# Patient Record
Sex: MALE | Race: WHITE | URBAN - METROPOLITAN AREA
[De-identification: names, ages, dates, MRNs, and addresses within clinical notes are randomized per-mention and may not be internally consistent; named-entity substitution may affect disease eponyms.]

---

## 2017-10-24 ENCOUNTER — APPOINTMENT (OUTPATIENT)
Dept: CT IMAGING | Facility: CLINIC | Age: 42
End: 2017-10-24
Attending: EMERGENCY MEDICINE
Payer: COMMERCIAL

## 2017-10-24 ENCOUNTER — HOSPITAL ENCOUNTER (EMERGENCY)
Facility: CLINIC | Age: 42
Discharge: HOME | End: 2017-10-24
Attending: EMERGENCY MEDICINE | Admitting: EMERGENCY MEDICINE
Payer: COMMERCIAL

## 2017-10-24 VITALS
DIASTOLIC BLOOD PRESSURE: 88 MMHG | SYSTOLIC BLOOD PRESSURE: 143 MMHG | OXYGEN SATURATION: 96 % | HEART RATE: 105 BPM | TEMPERATURE: 98.2 F

## 2017-10-24 DIAGNOSIS — R51.9 NONINTRACTABLE EPISODIC HEADACHE, UNSPECIFIED HEADACHE TYPE: ICD-10-CM

## 2017-10-24 DIAGNOSIS — R06.00 DYSPNEA, UNSPECIFIED TYPE: ICD-10-CM

## 2017-10-24 DIAGNOSIS — R91.8 PULMONARY NODULES: ICD-10-CM

## 2017-10-24 LAB — TROPONIN I SERPL-MCNC: <0.015 UG/L (ref 0–0.04)

## 2017-10-24 PROCEDURE — 96375 TX/PRO/DX INJ NEW DRUG ADDON: CPT

## 2017-10-24 PROCEDURE — 96374 THER/PROPH/DIAG INJ IV PUSH: CPT | Mod: 59

## 2017-10-24 PROCEDURE — 71260 CT THORAX DX C+: CPT

## 2017-10-24 PROCEDURE — 99284 EMERGENCY DEPT VISIT MOD MDM: CPT | Mod: 25

## 2017-10-24 PROCEDURE — 25000128 H RX IP 250 OP 636: Performed by: EMERGENCY MEDICINE

## 2017-10-24 PROCEDURE — 93005 ELECTROCARDIOGRAM TRACING: CPT

## 2017-10-24 PROCEDURE — 84484 ASSAY OF TROPONIN QUANT: CPT | Performed by: EMERGENCY MEDICINE

## 2017-10-24 PROCEDURE — 99285 EMERGENCY DEPT VISIT HI MDM: CPT | Mod: 25

## 2017-10-24 RX ORDER — METOCLOPRAMIDE 10 MG/1
10 TABLET ORAL 4 TIMES DAILY PRN
Qty: 15 TABLET | Refills: 0 | Status: SHIPPED | OUTPATIENT
Start: 2017-10-24

## 2017-10-24 RX ORDER — IOPAMIDOL 755 MG/ML
500 INJECTION, SOLUTION INTRAVASCULAR ONCE
Status: COMPLETED | OUTPATIENT
Start: 2017-10-24 | End: 2017-10-24

## 2017-10-24 RX ORDER — DIPHENHYDRAMINE HYDROCHLORIDE 50 MG/ML
25 INJECTION INTRAMUSCULAR; INTRAVENOUS ONCE
Status: COMPLETED | OUTPATIENT
Start: 2017-10-24 | End: 2017-10-24

## 2017-10-24 RX ADMIN — SODIUM CHLORIDE 91 ML: 9 INJECTION, SOLUTION INTRAVENOUS at 17:21

## 2017-10-24 RX ADMIN — PROCHLORPERAZINE EDISYLATE 10 MG: 5 INJECTION INTRAMUSCULAR; INTRAVENOUS at 15:51

## 2017-10-24 RX ADMIN — DIPHENHYDRAMINE HYDROCHLORIDE 25 MG: 50 INJECTION, SOLUTION INTRAMUSCULAR; INTRAVENOUS at 15:48

## 2017-10-24 RX ADMIN — IOPAMIDOL 83 ML: 755 INJECTION, SOLUTION INTRAVENOUS at 17:21

## 2017-10-24 ASSESSMENT — ENCOUNTER SYMPTOMS
SHORTNESS OF BREATH: 1
NUMBNESS: 0
APPETITE CHANGE: 1
CHEST TIGHTNESS: 1
HEADACHES: 1
BLOOD IN STOOL: 0
WEAKNESS: 1
CHILLS: 1

## 2017-10-24 NOTE — ED PROVIDER NOTES
History     Chief Complaint:  Headache and Shortness of Breath      HPI   Sanjay Soliz is a 42 year old diabetic male who presents with a one-week history of a headache and shortness of breath. He came from clinic today with labs below. Last week he had episodes of subjective fevers and chills that prompted him to call his clinic. He had a headache that came on with its typical presentation of prior headaches. Advil helps with his headaches for a period of time. He states he normally gets headaches which he attributes to his job, but they don't last for an entire week. This is associated with shortness of breath over the course of this week. He states he has been more active which tends to exacerbate this, in addition to some centralized chest pressure. The chest discomfort has been present for one week and constant. There are alleviating or aggravating factors to his pain. He also endorses decreased appetite which has left him with some generalized weakness. Patient denies numbness and hemoptysis.    Labs from clinic, 10/24/17  CBC:  WBC 4.2 (L), HGB 14.7,  (L)  BMP: Glucose 210 (H) o/w WNL  D-Dimer: 1.91 (H)    Cardiac Risk Factors   Sex: Male   Tobacco: Negative   Hypertension: Negative  Diabetes: Positive  Hyperlipidemia: Positive  Family History: Positive; Dad had a triple bypass 3 years ago at age 64.     PE/DVT Risk Factors   Personal History: Negative  Recent Travel: Positive; Travel to [place] 2 weeks ago  Recent Surgery/Hospitalization: Negative  Tobacco: Negative  Family History: Negative  Hormone Use: Negative   Cancer: Negative  Trauma: Negative      Allergies:  No known drug allergies.    Medications:    The patient is not currently taking any prescribed medications.     Past Medical History:    Diabetes  Hyperlipidemia   Testicular hypofunction     Past Surgical History:    History reviewed. No pertinent past surgical history.    Family History:    History reviewed. No pertinent family  history.    Social History:  Marital Status:   Presents to the ED from clinic with his wife.   Tobacco Use: Former smoker (Quit: 2011)  PCP: Rachelle Myers     Review of Systems   Constitutional: Positive for appetite change and chills.   Respiratory: Positive for chest tightness and shortness of breath.    Gastrointestinal: Negative for blood in stool.   Neurological: Positive for weakness and headaches. Negative for numbness.   All other systems reviewed and are negative.      Physical Exam   First Vitals:  BP: (!) 129/96  Pulse: 105  Temp: 98.2  F (36.8  C)  Resp: 20  SpO2: 96 %      Physical Exam    General:   Pleasant, age appropriate male resting with no signs of discomfort.  HEENT:    Oropharynx is moist, without lesions or trismus.     PERRL; EOMs intact  Eyes:    Conjunctiva normal  Neck:    Supple, no meningismus.     CV:     Tachycardic, regular rhythm.      No murmurs, rubs or gallops.       No JVD or unilateral leg swelling.       2+ radial pulses bilateral.       No lower extremity edema.  PULM:    Clear to auscultation bilateral.       No respiratory distress.      Good air exchange.     No rales or wheezing.     No stridor.  ABD:    Soft, non-tender, non-distended.       No pulsatile masses.       No rebound, guarding or rigidity.  MSK:     No gross deformity to all four extremities.   LYMPH:   No cervical lymphadenopathy.  NEURO:   Alert & oriented x3.      CN II-XII intact, speech is clear with no aphasia.       Finger to nose within normal limits.  No pronator drift.       Strength is 5/5 in all 4 extremities.  Sensation is intact.       Normal muscular tone, no tremor.     No atrophy.  Skin:    Warm, dry and intact.    Psych:    Mood is good and affect is appropriate.        Emergency Department Course   ECG:  @ 1457  Indication: shortness of breath   Vent. Rate 101 bpm. IN interval 152 ms. QRS duration 90 ms. QT/QTc 334/433 ms. P-R-T axis 24 28 38.   Sinus tachycardia. Otherwise  normal ECG.     Read @ 1458 by Dr. De La Torre.    Imaging:  Chest CT,  IV contrast only, PE protocol, per radiology:  IMPRESSION:   1. No pulmonary embolism demonstrated.  2. No thoracic aortic dissection or aneurysm.   3. 10 x 9 mm pulmonary nodule along the left major fissure. 3 month  follow-up study versus PET CT recommended based on clinical suspicion  and/or risk factors for further evaluation.    Radiographic findings were communicated with the patient who voiced understanding of the findings.    Laboratory:  Troponin: <0.015    Interventions:  1548: Benadryl, 25 mg, IV injection  1551: Compazine, 10  Mg, IV injection    Emergency Department Course:  Nursing notes and vitals reviewed.  I performed an exam of the patient as documented above.  The above workup was undertaken.  1806: I rechecked the patient and discussed results.  Findings and plan explained to the Patient. Patient discharged home, status improved, with instructions regarding supportive care, medications, and reasons to return as well as the importance of close follow-up was reviewed. Patient was prescribed Reglan.     Impression & Plan      Medical Decision Making:  Sanjay Soliz is a 42 year old male who presents to the ED with two complaints of 1) headache, 2) dyspnea. His primary complaint today is headache. He has no features consistent concerning for intracranial hemorrhage, mass, subarachnoid hemorrhage, glaucoma, temporal arteritis. Findings are consistent with mixed-type headache with migraine-tension features. Symptoms improved with interventions as described above. Patient will be discharged home with Reglan.     He had minor complaints of dyspnea in the context of a constellation of symptoms. He had an out patient D-Dimer which was elevated. With this elevated D-Dimer, dyspnea and recent travel history, CT chest was undertaken which reveals no evidence of PE. He has no signs of ACS. He was incidentally noted to have a right pulmonary  nodule which he was made aware of the need to follow up. Patient is safe for discharge home with close follow up with PCP.     Diagnosis:    ICD-10-CM    1. Nonintractable episodic headache, unspecified headache type R51    2. Dyspnea, unspecified type R06.00    3. Pulmonary nodules R91.8        Disposition:  Discharged to home.     Discharge Medications:  Discharge Medication List as of 10/24/2017  6:24 PM      START taking these medications    Details   metoclopramide (REGLAN) 10 MG tablet Take 1 tablet (10 mg) by mouth 4 times daily as needed, Disp-15 tablet, R-0, Local PrintPRN headache               I, Tonya Penny, am serving as a scribe on 10/24/2017 at 2:48 PM to personally document services performed by Dr. De La Torre based on my observations and the provider's statements to me.   [facility] EMERGENCY DEPARTMENT       Wilian De La Torre MD  10/24/17 1951

## 2017-10-24 NOTE — DISCHARGE INSTRUCTIONS
YOU WERE FOUND TO HAVE A LUNG NODULE THAT WILL NEED FURTHER FOLLOW UP WITH YOUR REGULAR DOCTOR.    Discharge Instructions  Headache    You were seen today for a headache. Headaches may be caused by many different things such as muscle tension, sinus inflammation, anxiety and stress, having too little sleep, too much alcohol, some medical conditions or injury. You may have a migraine, which is caused by changes in the blood vessels in your head.  At this time your provider does not find that your headache is a sign of anything dangerous or life-threatening.  However, sometimes the signs of serious illness do not show up right away.      Generally, every Emergency Department visit should have a follow-up clinic visit with either a primary or a specialty clinic/provider. Please follow-up as instructed by your emergency provider today.    Return to the Emergency Department if:    You get a new fever of 100.4 F or higher.    Your headache gets much worse.    You get a stiff neck with your headache.    You get a new headache that is significantly different or worse than headaches you have had before.    You are vomiting (throwing up) and cannot keep food or water down.    You have blurry or double vision or other problems with your eyes.    You have a new weakness on one side of your body.    You have difficulty with balance which is new.    You or your family thinks you are confused.    You have a seizure.    What can I do to help myself?    Pain medications - You may take a pain medication such as Tylenol  (acetaminophen), Advil , Motrin  (ibuprofen) or Aleve  (naproxen).    Take a pain reliever as soon as you notice symptoms.  Starting medications as soon as you start to have symptoms may lessen the amount of pain you have.    Relaxing in a quiet, dark room may help.    Get enough sleep and eat meals regularly.    You may need to watch for certain foods or other things which may trigger your headaches.  Keeping a  journal of your headaches and possible triggers may help you and your primary provider to identify things which you should avoid which may be causing your headaches.  If you were given a prescription for medicine here today, be sure to read all of the information (including the package insert) that comes with your prescription.  This will include important information about the medicine, its side effects, and any warnings that you need to know about.  The pharmacist who fills the prescription can provide more information and answer questions you may have about the medicine.  If you have questions or concerns that the pharmacist cannot address, please call or return to the Emergency Department.   Remember that you can always come back to the Emergency Department if you are not able to see your regular provider in the amount of time listed above, if you get any new symptoms, or if there is anything that worries you.  Discharge Instructions  Chest Pain    You have been seen today for chest pain or discomfort.  At this time, your provider has found no signs that your chest pain is due to a serious or life-threatening condition, (or you have declined more testing and/or admission to the hospital). However, sometimes there is a serious problem that does not show up right away. Your evaluation today may not be complete and you may need further testing and evaluation.     Generally, every Emergency Department visit should have a follow-up clinic visit with either a primary or a specialty clinic/provider. Please follow-up as instructed by your emergency provider today.  Return to the Emergency Department if:    Your chest pain changes, gets worse, starts to happen more often, or comes with less activity.    You are newly short of breath.    You get very weak or tired.    You pass out or faint.    You have any new symptoms, like fever, cough, numb legs, or you cough up blood.    You have anything else that worries  you.    Until you follow-up with your regular provider, please do the following:    Take one aspirin daily unless you have an allergy or are told not to by your provider.    If a stress test appointment has been made, go to the appointment.    If you have questions, contact your regular provider.    Follow-up with your regular provider/clinic as directed; this is very important.    If you were given a prescription for medicine here today, be sure to read all of the information (including the package insert) that comes with your prescription.  This will include important information about the medicine, its side effects, and any warnings that you need to know about.  The pharmacist who fills the prescription can provide more information and answer questions you may have about the medicine.  If you have questions or concerns that the pharmacist cannot address, please call or return to the Emergency Department.       Remember that you can always come back to the Emergency Department if you are not able to see your regular provider in the amount of time listed above, if you get any new symptoms, or if there is anything that worries you.  Discharge Instructions  Incidental Findings    An incidental finding is something unexpected that was found while you were being treated and is felt to not be related to the reason that you came to the Emergency Department.  While this finding is not an emergency, you need to follow up with your primary provider (or occasionally a specialist) to determine if anything should be done about it.    These findings can come from:    Checking your vital signs (example: high blood pressure).    Taking your history (example: unexplained weight loss).    The physical exam (example: a heart murmur).    Laboratory study (example: anemia or low blood count).    X-rays/ultrasound/CT or other imaging (example: an unexplained mass).    Generally, every Emergency Department visit should have a follow-up  clinic visit with either a primary or a specialty clinic/provider. Please follow-up as instructed by your emergency provider today.    Return to the Emergency Department if:    Your condition worsens.    You develop unexpected pain.    You now develop new symptoms or have new concerns.  If you were given a prescription for medicine here today, be sure to read all of the information (including the package insert) that comes with your prescription.  This will include important information about the medicine, its side effects, and any warnings that you need to know about.  The pharmacist who fills the prescription can provide more information and answer questions you may have about the medicine.  If you have questions or concerns that the pharmacist cannot address, please call or return to the Emergency Department.   Remember that you can always come back to the Emergency Department if you are not able to see your regular provider in the amount of time listed above, if you get any new symptoms, or if there is anything that worries you.

## 2017-10-24 NOTE — ED NOTES
Patient presents from clinic with headache and shortness of breath for one week, D-dimer was elevated at clinic, alert and oriented, ABCs intact.

## 2017-10-24 NOTE — ED AVS SNAPSHOT
[facility] Emergency Department    [address]    [facility] [address]    Phone:  [phone]    Fax:  [phone]                                       Sanjay Soliz   MRN: [id number]    Department:  [facility] Emergency Department   Date of Visit:  10/24/2017           Patient Information     Date Of Birth          1975        Your diagnoses for this visit were:     Nonintractable episodic headache, unspecified headache type     Dyspnea, unspecified type     Pulmonary nodules        You were seen by Wilian De La Torre MD.      Follow-up Information     Follow up with Rachelle Myers MD. Schedule an appointment as soon as possible for a visit in 3 days.    Specialty:  Family Practice    Contact information:    [facility]  [address] [facility] [id number] [phone]          Discharge Instructions       YOU WERE FOUND TO HAVE A LUNG NODULE THAT WILL NEED FURTHER FOLLOW UP WITH YOUR REGULAR DOCTOR.    Discharge Instructions  Headache    You were seen today for a headache. Headaches may be caused by many different things such as muscle tension, sinus inflammation, anxiety and stress, having too little sleep, too much alcohol, some medical conditions or injury. You may have a migraine, which is caused by changes in the blood vessels in your head.  At this time your provider does not find that your headache is a sign of anything dangerous or life-threatening.  However, sometimes the signs of serious illness do not show up right away.      Generally, every Emergency Department visit should have a follow-up clinic visit with either a primary or a specialty clinic/provider. Please follow-up as instructed by your emergency provider today.    Return to the Emergency Department if:    You get a new fever of 100.4 F or higher.    Your headache gets much worse.    You get a stiff neck with your headache.    You get a new headache that is significantly different  or worse than headaches you have had before.    You are vomiting (throwing up) and cannot keep food or water down.    You have blurry or double vision or other problems with your eyes.    You have a new weakness on one side of your body.    You have difficulty with balance which is new.    You or your family thinks you are confused.    You have a seizure.    What can I do to help myself?    Pain medications - You may take a pain medication such as Tylenol  (acetaminophen), Advil , Motrin  (ibuprofen) or Aleve  (naproxen).    Take a pain reliever as soon as you notice symptoms.  Starting medications as soon as you start to have symptoms may lessen the amount of pain you have.    Relaxing in a quiet, dark room may help.    Get enough sleep and eat meals regularly.    You may need to watch for certain foods or other things which may trigger your headaches.  Keeping a journal of your headaches and possible triggers may help you and your primary provider to identify things which you should avoid which may be causing your headaches.  If you were given a prescription for medicine here today, be sure to read all of the information (including the package insert) that comes with your prescription.  This will include important information about the medicine, its side effects, and any warnings that you need to know about.  The pharmacist who fills the prescription can provide more information and answer questions you may have about the medicine.  If you have questions or concerns that the pharmacist cannot address, please call or return to the Emergency Department.   Remember that you can always come back to the Emergency Department if you are not able to see your regular provider in the amount of time listed above, if you get any new symptoms, or if there is anything that worries you.  Discharge Instructions  Chest Pain    You have been seen today for chest pain or discomfort.  At this time, your provider has found no signs  that your chest pain is due to a serious or life-threatening condition, (or you have declined more testing and/or admission to the hospital). However, sometimes there is a serious problem that does not show up right away. Your evaluation today may not be complete and you may need further testing and evaluation.     Generally, every Emergency Department visit should have a follow-up clinic visit with either a primary or a specialty clinic/provider. Please follow-up as instructed by your emergency provider today.  Return to the Emergency Department if:    Your chest pain changes, gets worse, starts to happen more often, or comes with less activity.    You are newly short of breath.    You get very weak or tired.    You pass out or faint.    You have any new symptoms, like fever, cough, numb legs, or you cough up blood.    You have anything else that worries you.    Until you follow-up with your regular provider, please do the following:    Take one aspirin daily unless you have an allergy or are told not to by your provider.    If a stress test appointment has been made, go to the appointment.    If you have questions, contact your regular provider.    Follow-up with your regular provider/clinic as directed; this is very important.    If you were given a prescription for medicine here today, be sure to read all of the information (including the package insert) that comes with your prescription.  This will include important information about the medicine, its side effects, and any warnings that you need to know about.  The pharmacist who fills the prescription can provide more information and answer questions you may have about the medicine.  If you have questions or concerns that the pharmacist cannot address, please call or return to the Emergency Department.       Remember that you can always come back to the Emergency Department if you are not able to see your regular provider in the amount of time listed above, if  you get any new symptoms, or if there is anything that worries you.  Discharge Instructions  Incidental Findings    An incidental finding is something unexpected that was found while you were being treated and is felt to not be related to the reason that you came to the Emergency Department.  While this finding is not an emergency, you need to follow up with your primary provider (or occasionally a specialist) to determine if anything should be done about it.    These findings can come from:    Checking your vital signs (example: high blood pressure).    Taking your history (example: unexplained weight loss).    The physical exam (example: a heart murmur).    Laboratory study (example: anemia or low blood count).    X-rays/ultrasound/CT or other imaging (example: an unexplained mass).    Generally, every Emergency Department visit should have a follow-up clinic visit with either a primary or a specialty clinic/provider. Please follow-up as instructed by your emergency provider today.    Return to the Emergency Department if:    Your condition worsens.    You develop unexpected pain.    You now develop new symptoms or have new concerns.  If you were given a prescription for medicine here today, be sure to read all of the information (including the package insert) that comes with your prescription.  This will include important information about the medicine, its side effects, and any warnings that you need to know about.  The pharmacist who fills the prescription can provide more information and answer questions you may have about the medicine.  If you have questions or concerns that the pharmacist cannot address, please call or return to the Emergency Department.   Remember that you can always come back to the Emergency Department if you are not able to see your regular provider in the amount of time listed above, if you get any new symptoms, or if there is anything that worries you.      24 Hour Appointment Hotline        To make an appointment at any [facility], call [phone] ([phone]). If you don't have a family doctor or clinic, we will help you find one. [facility] are conveniently located to serve the needs of you and your family.             Review of your medicines      START taking        Dose / Directions Last dose taken    metoclopramide 10 MG tablet   Commonly known as:  REGLAN   Dose:  10 mg   Quantity:  15 tablet        Take 1 tablet (10 mg) by mouth 4 times daily as needed   Refills:  0                Prescriptions were sent or printed at these locations (1 Prescription)                   Other Prescriptions                Printed at Department/Unit printer (1 of 1)         metoclopramide (REGLAN) 10 MG tablet                Procedures and tests performed during your visit     Chest CT, IV contrast only - PE protocol    EKG 12 lead    Peripheral IV catheter    Troponin I      Orders Needing Specimen Collection     None      Pending Results     Date and Time Order Name Status Description    10/24/2017 1504 EKG 12 lead Preliminary     10/24/2017 1502 Chest CT, IV contrast only - PE protocol Preliminary             Pending Culture Results     No orders found from 10/22/2017 to 10/25/2017.            Pending Results Instructions     If you had any lab results that were not finalized at the time of your Discharge, you can call the ED Lab Result RN at [phone]. You will be contacted by this team for any positive Lab results or changes in treatment. The nurses are available 7 days a week from 10A to 6:30P.  You can leave a message 24 hours per day and they will return your call.        Test Results From Your Hospital Stay        10/24/2017  4:15 PM      Component Results     Component Value Ref Range & Units Status    Troponin I ES <0.015 0.000 - 0.045 ug/L Final    The 99th percentile for upper reference range is 0.045 ug/L.  Troponin values   in the range of 0.045 - 0.120 ug/L may be  associated with risks of adverse   clinical events.           10/24/2017  5:54 PM      Narrative     CT CHEST AND PULMONARY EMBOLISM ANGIOGRAM  10/24/2017 5:27 PM     HISTORY: Dyspnea, elevated D-dimer.    COMPARISON: None.    TECHNIQUE: Volumetric helical acquisition of CT images of the chest  from the lung apices to the kidneys were acquired after the  administration of 83 mL Isovue-370  IV contrast. Radiation dose for  this scan was reduced using automated exposure control, adjustment of  the mA and/or kV according to patient size, or iterative  reconstruction technique.    FINDINGS: Moderate suboptimal contrast bolus timing. There is no  pulmonary embolism. Lungs are clear of infiltrate. 1.0 x 0.9 cm nodule  near the left major fissure on image 71. The heart is not enlarged.  Thoracic aorta is atherosclerotic without evidence of dissection or  aneurysm. There is no pleural or pericardial effusion. There is no  pneumothorax. Adrenal glands are normal. Remainder of the visualized  upper abdomen is unremarkable.        Impression     IMPRESSION:   1. No pulmonary embolism demonstrated.  2. No thoracic aortic dissection or aneurysm.   3. 10 x 9 mm pulmonary nodule along the left major fissure. 3 month  follow-up study versus PET CT recommended based on clinical suspicion  and/or risk factors for further evaluation.                Clinical Quality Measure: Blood Pressure Screening     Your blood pressure was checked while you were in the emergency department today. The last reading we obtained was  BP: (!) 151/91 . Please read the guidelines below about what these numbers mean and what you should do about them.  If your systolic blood pressure (the top number) is less than 120 and your diastolic blood pressure (the bottom number) is less than 80, then your blood pressure is normal. There is nothing more that you need to do about it.  If your systolic blood pressure (the top number) is 120-139 or your diastolic blood  "pressure (the bottom number) is 80-89, your blood pressure may be higher than it should be. You should have your blood pressure rechecked within a year by a primary care provider.  If your systolic blood pressure (the top number) is 140 or greater or your diastolic blood pressure (the bottom number) is 90 or greater, you may have high blood pressure. High blood pressure is treatable, but if left untreated over time it can put you at risk for heart attack, stroke, or kidney failure. You should have your blood pressure rechecked by a primary care provider within the next 4 weeks.  If your provider in the emergency department today gave you specific instructions to follow-up with your doctor or provider even sooner than that, you should follow that instruction and not wait for up to 4 weeks for your follow-up visit.        Thank you for choosing [facility]       Thank you for choosing [facility] for your care. Our goal is always to provide you with excellent care. Hearing back from our patients is one way we can continue to improve our services. Please take a few minutes to complete the written survey that you may receive in the mail after you visit with us. Thank you!        [facility] Information     [facility] lets you send messages to your doctor, view your test results, renew your prescriptions, schedule appointments and more. To sign up, go to [email or web address] . Click on \"Log in\" on the left side of the screen, which will take you to the Welcome page. Then click on \"Sign up Now\" on the right side of the page.     You will be asked to enter the access code listed below, as well as some personal information. Please follow the directions to create your username and password.     Your access code is: [email or web address]  Expires: 2018  5:07 PM     Your access code will  in 90 days. If you need help or a new code, please call your [facility] clinic or [phone].        Care EveryWhere ID     This is your Care " EveryWhere ID. This could be used by other organizations to access your [facility] medical records  [id number]        Equal Access to Services     GRABIEL TEIXEIRA : Steven Kellogg, syeda ramos, theresa rosales, dilip martinez. So [facility] [phone].    ATENCIÓN: Si habla español, tiene a hernandez disposición servicios gratuitos de asistencia lingüística. Llame al [phone].    We comply with applicable federal civil rights laws and [place] laws. We do not discriminate on the basis of race, color, national origin, age, disability, sex, sexual orientation, or gender identity.            After Visit Summary       This is your record. Keep this with you and show to your community pharmacist(s) and doctor(s) at your next visit.

## 2017-10-24 NOTE — ED NOTES
Pt resting Comfortably in bed.  Alert & Oriented  Pt C/O Migratory pain in head.  Not photophobic.

## 2017-10-24 NOTE — ED AVS SNAPSHOT
[facility] Emergency Department    [address]    [facility] [address]    Phone:  [phone]    Fax:  [phone]                                       Sanjay Soliz   MRN: [id number]    Department:  [facility] Emergency Department   Date of Visit:  10/24/2017           After Visit Summary Signature Page     I have received my discharge instructions, and my questions have been answered. I have discussed any challenges I see with this plan with the nurse or doctor.    ..........................................................................................................................................  Patient/Patient Representative Signature      ..........................................................................................................................................  Patient Representative Print Name and Relationship to Patient    ..................................................               ................................................  Date                                            Time    ..........................................................................................................................................  Reviewed by Signature/Title    ...................................................              ..............................................  Date                                                            Time

## 2017-10-25 LAB — INTERPRETATION ECG - MUSE: NORMAL

## 2022-06-10 ENCOUNTER — DOCUMENTATION ONLY (OUTPATIENT)
Dept: OTHER | Facility: CLINIC | Age: 47
End: 2022-06-10
Payer: COMMERCIAL